# Patient Record
(demographics unavailable — no encounter records)

---

## 2025-06-18 NOTE — ASSESSMENT
[FreeTextEntry1] : Discussed Low Na Sat fat refined CHO diet Add Vit D 3 56361 units q week Inc po fluids Con't Crestor 10 mg hs Inc valsartan 160/25  1 daily All questions answered Re check 3 months

## 2025-06-18 NOTE — HISTORY OF PRESENT ILLNESS
[FreeTextEntry1] : Feels exhausted started Crestor Has not started Vit D yet Seeing nutritionist No fever chills cough wheeze No CP SOB Palpitations Eating sleeping well Lost 10 lbs No additional complaints  [de-identified] : Rx mgmt

## 2025-06-18 NOTE — REVIEW OF SYSTEMS
[Fatigue] : fatigue [Fever] : no fever [Chills] : no chills [Discharge] : no discharge [Pain] : no pain [Itching] : no itching [Earache] : no earache [Hearing Loss] : no hearing loss [Sore Throat] : no sore throat [Postnasal Drip] : no postnasal drip [Chest Pain] : no chest pain [Palpitations] : no palpitations [Lower Ext Edema] : no lower extremity edema [Shortness Of Breath] : no shortness of breath [Wheezing] : no wheezing [Cough] : no cough [Abdominal Pain] : no abdominal pain [Nausea] : no nausea [Constipation] : no constipation [Vomiting] : no vomiting [Dysuria] : no dysuria [Incontinence] : no incontinence [Frequency] : no frequency [Joint Pain] : no joint pain [Headache] : no headache [Skin Rash] : no skin rash [Dizziness] : no dizziness [Memory Loss] : no memory loss

## 2025-06-18 NOTE — ASSESSMENT
[FreeTextEntry1] : Discussed Low Na Sat fat refined CHO diet Add Vit D 3 13053 units q week Inc po fluids Con't Crestor 10 mg hs Inc valsartan 160/25  1 daily All questions answered Re check 3 months

## 2025-06-18 NOTE — HISTORY OF PRESENT ILLNESS
[FreeTextEntry1] : Feels exhausted started Crestor Has not started Vit D yet Seeing nutritionist No fever chills cough wheeze No CP SOB Palpitations Eating sleeping well Lost 10 lbs No additional complaints  [de-identified] : Rx mgmt